# Patient Record
Sex: MALE | Race: BLACK OR AFRICAN AMERICAN | NOT HISPANIC OR LATINO | URBAN - METROPOLITAN AREA
[De-identification: names, ages, dates, MRNs, and addresses within clinical notes are randomized per-mention and may not be internally consistent; named-entity substitution may affect disease eponyms.]

---

## 2019-12-15 ENCOUNTER — EMERGENCY (EMERGENCY)
Facility: HOSPITAL | Age: 24
LOS: 1 days | Discharge: HOME | End: 2019-12-15
Attending: EMERGENCY MEDICINE | Admitting: EMERGENCY MEDICINE
Payer: SELF-PAY

## 2019-12-15 VITALS
OXYGEN SATURATION: 100 % | RESPIRATION RATE: 18 BRPM | DIASTOLIC BLOOD PRESSURE: 79 MMHG | HEART RATE: 68 BPM | TEMPERATURE: 100 F | SYSTOLIC BLOOD PRESSURE: 144 MMHG

## 2019-12-15 VITALS
RESPIRATION RATE: 18 BRPM | TEMPERATURE: 98 F | DIASTOLIC BLOOD PRESSURE: 80 MMHG | SYSTOLIC BLOOD PRESSURE: 123 MMHG | OXYGEN SATURATION: 100 % | HEART RATE: 67 BPM

## 2019-12-15 PROCEDURE — 99283 EMERGENCY DEPT VISIT LOW MDM: CPT

## 2019-12-15 RX ORDER — OFLOXACIN 0.3 %
1 DROPS OPHTHALMIC (EYE)
Qty: 1 | Refills: 0
Start: 2019-12-15

## 2019-12-15 NOTE — ED PROVIDER NOTE - ATTENDING CONTRIBUTION TO CARE
Dr. Laguerre:  I have personally performed a face to face bedside history and physical examination of this patient. I have discussed the history, examination, review of systems, assessment and plan of management with the resident. I have reviewed the electronic medical record and amended it to reflect my history, review of systems, physical exam, assessment and plan.    24M denies pmh presents with bilateral eye redness and pain x 1 week, progressively worsening pain.  Complains of "grittiness" sensation and now blurry vision.  Denies constitutional symptoms, no pain behind the eye.  +Photophobia    Exam:  - nad  - +bilateral injection, +significant fluorescein uptake     A/P  - consider adenovirus infection with epidemic keratoconjunctivitis, concern for ulcer  - ophthalmology consult

## 2019-12-15 NOTE — ED PROVIDER NOTE - OBJECTIVE STATEMENT
Pt is a 23 y/o M w/ no PMHx who p/w b/l eye redness since Monday. Pt notes hat his symptoms started on Monday which some zion feeling red eye which has progressed and has become more red and painful, +rhinorrhea and nasal congestion recently, no recent sick contacts, works at [employer] as a . Went to optometrist today and was told to come to ER. no deep eye pain and now deep eye pain on movement but feels gritty and zion, the patient states he has blurred vision and cannot even read somewhat larger writing on the wall and everything is blurry.

## 2019-12-15 NOTE — ED ADULT TRIAGE NOTE - CHIEF COMPLAINT QUOTE
Pt co clear drainage and redness to bilateral eyes since Mondays, states symptoms became worse yesterday. Eyelids swollen, pt states he has pain when closing eyes. States " I feel like I have sand in my eyes". Seen at urgent care on Friday and told he has pink eye and prescribed antibiotics. Seen by eye doctor today and was told he may have orbital cellulitis. Sent to ED for evaluation.

## 2019-12-15 NOTE — ED PROVIDER NOTE - NSFOLLOWUPINSTRUCTIONS_ED_ALL_ED_FT
Please take medication as prescribe. Your eyes will be contagious as long as eyes are red or have active tearing, change bed sheets, pillow cases, hand towels frequently, wash hand frequently.     Please Follow up in Optho Clinic as scheduled  [address] [place] [address] [phone]

## 2019-12-15 NOTE — ED PROVIDER NOTE - CLINICAL SUMMARY MEDICAL DECISION MAKING FREE TEXT BOX
Pt is a 25 y/o M w/ no PMHx who p/w b/l eye redness since Monday concerning for corneal ulceration due to kartoketitis due to Adenovirus will consult optho as patient may need stripping of membranes, erythro ointment, and very close ophtho follow-up.

## 2019-12-15 NOTE — ED PROVIDER NOTE - PATIENT PORTAL LINK FT
You can access the FollowMyHealth Patient Portal offered by [facility] by registering at the following website: [email or web address]. By joining [facility]’s FollowMyHealth portal, you will also be able to view your health information using other applications (apps) compatible with our system.

## 2019-12-15 NOTE — CONSULT NOTE ADULT - SUBJECTIVE AND OBJECTIVE BOX
[facility] Ophthalmology Consult Note    HPI:  The pt is a 23 y/o M w/ no PMHx who p/w a 6 day history of b/l eye redness. The pt noted eye redness, pain and watery discharge Monday and this prompted a visit to an urgent care center. The pt was diagnosed with conjunctivitis and prescribed an antibiotic eye drop. The pt does report an episode of rhinorrhea and nasal congestion two weeks ago, deniesrecent sick contacts. The pt has been experiencing increased eye pain and eye redness which prompted him to visit an optometrist today and was told to come to ER for evaluation. The pt feels eye pain on movement, described as a gritty foreign body sensation in the eye.     PMH: None  Meds: None  POcHx (including surgeries/lasers/trauma):  as above   Drops: None  FamHx: None  Social Hx: None  Allergies: NKDA    ROS:  General (neg), Vision (per HPI), Head and Neck (neg), Neuro (neg),     Mood and Affect Appropriate ( x ),  Oriented to Time, Place, and Person x 3 ( x )    Ophthalmology Exam    Visual acuity (sc): 20/100 OU, no improvement with pinhole  Pupils: PERRL OU, no APD  Ttono: 18 OU  Extraocular movements (EOMs): Full OU, no pain, no diplopia      Slit Lamp Exam (SLE)  External:  Flat OU  Lids/Lashes/Lacrimal Ducts: +1 edema of the bilateral upper lids,   Sclera/Conjunctiva:  +3 injection over the bulbar and palpebral conjunctiva OU, mixed follicular and papillary reaction of the palpebral conjunctiva   Cornea: OD: , OS: 3.6 mm (v) x 4.6 mm (h) area of stain uptake centrally,   Anterior Chamber: D+Q OU   Iris:  Flat OU  Lens:  Cl OU    Fundus Exam: dilated with 1% tropicamide and 2.5% phenylephrine  Approval obtained from primary team for dilation  Patient aware that pupils can remained dilated for at least 4-6 hours  Exam performed with 20D lens    Vitreous: wnl OU  Disc, cup/disc: sharp and pink, 0.4 OU  Macula:  wnl OU  Vessels:  wnl OU  Periphery: wnl OU      Assessment and Plan:      Follow-Up:  Patient should follow up his/her ophthalmologist or in the [facility] Ophthalmology Practice within 1 week of discharge  [address] [place] [address] [phone]    S/D/W Dr Durham (attending) [facility] Ophthalmology Consult Note    HPI:  The pt is a 23 y/o M w/ no PMHx who p/w a 6 day history of b/l eye redness. The pt noted eye redness, pain and watery discharge Monday and this prompted a visit to an urgent care center. The pt was diagnosed with conjunctivitis and prescribed an antibiotic eye drop. The pt does report an episode of rhinorrhea and nasal congestion two weeks ago, deniesrecent sick contacts. The pt has been experiencing increased eye pain and eye redness which prompted him to visit an optometrist today and was told to come to ER for evaluation. The pt feels eye pain on movement, described as a gritty foreign body sensation in the eye.     PMH: None  Meds: None  POcHx (including surgeries/lasers/trauma):  as above   Drops: None  FamHx: None  Social Hx: None  Allergies: NKDA    ROS:  General (neg), Vision (per HPI), Head and Neck (neg), Neuro (neg),     Mood and Affect Appropriate ( x ),  Oriented to Time, Place, and Person x 3 ( x )    Ophthalmology Exam    Visual acuity (sc): 20/100 OU, no improvement with pinhole  Pupils: PERRL OU, no APD  Ttono: 18 OU  Extraocular movements (EOMs): Full OU, no pain, no diplopia      Slit Lamp Exam (SLE)  External:  Flat OU, negative pre-auricular node tenderness   Lids/Lashes/Lacrimal Ducts: +1 edema of the bilateral upper lids, watery discharge  OU. has innumerable pinpoint hemorrhages on the sub-conjunctiva OU. Flipped upper lids OU and removed with cotton tip applicator numerous membranes OU. Pt also has lower palpebral conjunctival membranes.   Sclera/Conjunctiva:  +3 injection over the bulbar and palpebral conjunctiva OU, mixed follicular and papillary reaction of the palpebral conjunctiva   Cornea: OD: , OS: 3.6 mm (v) x 4.6 mm (h) area of stain uptake centrally, OS: 7.5 mm (v) x 5.5 mm (h) area of stain uptake centrally   Anterior Chamber: D+Q OU   Iris:  Flat OU  Lens:  Cl OU    Fundus Exam: dilated with 1% tropicamide and 2.5% phenylephrine  Approval obtained from primary team for dilation  Patient aware that pupils can remained dilated for at least 4-6 hours  Exam performed with 20D lens    Vitreous: wnl OU  Disc, cup/disc: sharp and pink, 0.4 OU  Macula:  wnl OU  Vessels:  wnl OU  Periphery: limited view OU      Assessment and Plan:  23 y/o M w/ no PMHx who p/w a 6 day history of b/l eye redness with pain and watery discharge. Exam today most consistent with EKC/viral conjunctivitis with epithelial erosions and membrane formation.     - recommend continuation of Ofloxacin one drop four times per day both eyes  - counseled pt and family on course that may require 3-4 weeks to resolve, initially worsening before improving. Aso recommended frequent handwashing  - eyes will be contagious as long as eyes are red or have active tearing, change bed sheets, pillow cases, hand towels frequently  - can use preservative free artificial tears 4-8x per day in both eyes, single use vials preferred so as to prevent bottle tip contamination  - cool compresses over eyelids 3-4 times per day   - will have pt follow up monday to assess for improvement of epithelial erosions OU, may benefit from steroid as condition progresses   - pt also needs to have membranes removed with cotton tip to prevent symblepharon formation       Follow-Up:  Patient should follow up his/her ophthalmologist or in the [facility] Ophthalmology Practice within 1 week of discharge  [address] [facility] [place] [address] [phone]

## 2019-12-16 ENCOUNTER — APPOINTMENT (OUTPATIENT)
Dept: OPHTHALMOLOGY | Facility: CLINIC | Age: 24
End: 2019-12-16

## 2019-12-17 ENCOUNTER — APPOINTMENT (OUTPATIENT)
Dept: OPHTHALMOLOGY | Facility: CLINIC | Age: 24
End: 2019-12-17

## 2022-08-09 NOTE — ED ADULT TRIAGE NOTE - BP NONINVASIVE SYSTOLIC (MM HG)
Is This A New Presentation, Or A Follow-Up?: Dandruff
Additional History: Pt presents for dandruff on the scalp\\nItching on the scalp has been present for years\\nLast time patient came in he was given Ciclopirox shampoo for the scalp which he said helped\\n
123